# Patient Record
Sex: FEMALE | Race: WHITE | NOT HISPANIC OR LATINO | URBAN - METROPOLITAN AREA
[De-identification: names, ages, dates, MRNs, and addresses within clinical notes are randomized per-mention and may not be internally consistent; named-entity substitution may affect disease eponyms.]

---

## 2017-11-07 ENCOUNTER — APPOINTMENT (RX ONLY)
Dept: URBAN - METROPOLITAN AREA CLINIC 76 | Facility: CLINIC | Age: 37
Setting detail: DERMATOLOGY
End: 2017-11-07

## 2017-11-07 DIAGNOSIS — L70.0 ACNE VULGARIS: ICD-10-CM

## 2017-11-07 DIAGNOSIS — Z71.89 OTHER SPECIFIED COUNSELING: ICD-10-CM

## 2017-11-07 DIAGNOSIS — L81.4 OTHER MELANIN HYPERPIGMENTATION: ICD-10-CM

## 2017-11-07 DIAGNOSIS — L82.1 OTHER SEBORRHEIC KERATOSIS: ICD-10-CM

## 2017-11-07 DIAGNOSIS — Z87.2 PERSONAL HISTORY OF DISEASES OF THE SKIN AND SUBCUTANEOUS TISSUE: ICD-10-CM

## 2017-11-07 DIAGNOSIS — D22 MELANOCYTIC NEVI: ICD-10-CM

## 2017-11-07 DIAGNOSIS — L91.8 OTHER HYPERTROPHIC DISORDERS OF THE SKIN: ICD-10-CM

## 2017-11-07 PROBLEM — L85.3 XEROSIS CUTIS: Status: ACTIVE | Noted: 2017-11-07

## 2017-11-07 PROBLEM — D22.5 MELANOCYTIC NEVI OF TRUNK: Status: ACTIVE | Noted: 2017-11-07

## 2017-11-07 PROCEDURE — 99213 OFFICE O/P EST LOW 20 MIN: CPT

## 2017-11-07 PROCEDURE — ? COUNSELING

## 2017-11-07 PROCEDURE — ? IN-HOUSE DISPENSING PHARMACY

## 2017-11-07 PROCEDURE — ? TREATMENT REGIMEN

## 2017-11-07 ASSESSMENT — LOCATION ZONE DERM
LOCATION ZONE: FACE
LOCATION ZONE: TRUNK
LOCATION ZONE: ARM

## 2017-11-07 ASSESSMENT — LOCATION DETAILED DESCRIPTION DERM
LOCATION DETAILED: INFERIOR THORACIC SPINE
LOCATION DETAILED: RIGHT VENTRAL PROXIMAL FOREARM
LOCATION DETAILED: RIGHT DISTAL DORSAL FOREARM
LOCATION DETAILED: RIGHT AREOLA
LOCATION DETAILED: RIGHT SUPERIOR MEDIAL UPPER BACK
LOCATION DETAILED: LEFT INFERIOR MEDIAL MALAR CHEEK

## 2017-11-07 ASSESSMENT — LOCATION SIMPLE DESCRIPTION DERM
LOCATION SIMPLE: RIGHT FOREARM
LOCATION SIMPLE: UPPER BACK
LOCATION SIMPLE: RIGHT UPPER BACK
LOCATION SIMPLE: RIGHT BREAST
LOCATION SIMPLE: LEFT CHEEK

## 2017-11-07 NOTE — PROCEDURE: TREATMENT REGIMEN
Samples Given: Retina-A micro
Plan: Courtesy removal x1 left breast
Detail Level: Simple
Plan: Patient will try sample and if she likes it will purchase [place]  Tretinoin with x6 rfs

## 2017-11-07 NOTE — PROCEDURE: IN-HOUSE DISPENSING PHARMACY
Product 20 Unit Type: mg
Product 24 Refills: 0
Name Of Product 8: Taza 0.1% Cream - 521925
Product 3 Units Dispensed: 1
Name Of Product 51: Anti- Fungal Nail Solution - 862513
Name Of Product 5: Clind / Tret Combo Cream - 812745
Product 41 Price/Unit (In Dollars): 40.00
Product 24 Application Directions: Apply to affected area one time a day.
Product 1 Application Directions: Use as face or body wash daily.
Product 4 Price/Unit (In Dollars): 50.00
Product 2 Application Directions: Apply to affected area in the evening after moisturizer.  Avoid eyelids.
Name Of Product 14: Dermatitis Topical Foam - 203612
Name Of Product 33: [place]ro 0.1% Ointment - 894945
Send Charges To Patient Encounter: Yes
Product 3 Application Directions: Apply to acne prone area after moisturizer. Avoid eyelids.
Name Of Product 41: Hydroquin 6% Combo Cream - 533983
Name Of Product 7: Sod Sulf 10% / Sulf 2% - 738039
Name Of Product 24: Triamcin 1% Ointment - 804869
Product 5 Application Directions: Apply to affected area in the evening after moisturizer.  Avoid eyelids.
Name Of Product 4: Acne Gel w/ Dapsone - 857780
Name Of Product 6: Adap Combo Cream - 888954
Product 3 Refills: 6
Name Of Product 11: Clob 0.05% Solution - 693955
Name Of Product 13: Clob 0.05% Cream - 316297
Detail Level: Zone
Product 21 Application Directions: Apply to affected area in the evening or every other evening.
Name Of Product 42: Kojic Melasma Cream - 940155
Product 13 Application Directions: Apply to affected area two times a day.
Product 42 Application Directions: Apply to hyperpigmented areas in the evening after moisturizer.
Name Of Product 1: Acne Body Wash - 261801
Product 51 Application Directions: Apply to affected nails daily for 10 months.
Product 4 Application Directions: Apply to affected area before moisturizer one time a day.
Name Of Product 12: Iodoquin / Collins Combo - 240478
Name Of Product 2: Tret 0.05% Cream - 499856
Name Of Product 3: Richar / Clind Combo - 405162
Name Of Product 31: Ivermec 1% / Met 1% Gel - 999604
Name Of Product 21: Imiqui 5% / Levo 1% Gel - 792245